# Patient Record
Sex: FEMALE | URBAN - METROPOLITAN AREA
[De-identification: names, ages, dates, MRNs, and addresses within clinical notes are randomized per-mention and may not be internally consistent; named-entity substitution may affect disease eponyms.]

---

## 2020-09-30 ENCOUNTER — APPOINTMENT (OUTPATIENT)
Dept: URBAN - METROPOLITAN AREA CLINIC 319 | Age: 65
Setting detail: DERMATOLOGY
End: 2020-09-30

## 2020-09-30 DIAGNOSIS — Z85.828 PERSONAL HISTORY OF OTHER MALIGNANT NEOPLASM OF SKIN: ICD-10-CM

## 2020-09-30 DIAGNOSIS — D485 NEOPLASM OF UNCERTAIN BEHAVIOR OF SKIN: ICD-10-CM

## 2020-09-30 PROBLEM — D48.5 NEOPLASM OF UNCERTAIN BEHAVIOR OF SKIN: Status: ACTIVE | Noted: 2020-09-30

## 2020-09-30 PROCEDURE — 11102 TANGNTL BX SKIN SINGLE LES: CPT

## 2020-09-30 PROCEDURE — OTHER PHOTO-DOCUMENTATION: OTHER

## 2020-09-30 PROCEDURE — OTHER MIPS QUALITY: OTHER

## 2020-09-30 PROCEDURE — OTHER COUNSELING: OTHER

## 2020-09-30 PROCEDURE — 11103 TANGNTL BX SKIN EA SEP/ADDL: CPT

## 2020-09-30 PROCEDURE — OTHER BIOPSY BY SHAVE METHOD: OTHER

## 2020-09-30 PROCEDURE — 99202 OFFICE O/P NEW SF 15 MIN: CPT | Mod: 25

## 2020-09-30 ASSESSMENT — LOCATION ZONE DERM
LOCATION ZONE: TRUNK
LOCATION ZONE: FACE

## 2020-09-30 ASSESSMENT — LOCATION DETAILED DESCRIPTION DERM
LOCATION DETAILED: RIGHT MEDIAL TEMPLE
LOCATION DETAILED: RIGHT MEDIAL SUPERIOR CHEST

## 2020-09-30 ASSESSMENT — LOCATION SIMPLE DESCRIPTION DERM
LOCATION SIMPLE: RIGHT TEMPLE
LOCATION SIMPLE: CHEST

## 2020-09-30 NOTE — PROCEDURE: BIOPSY BY SHAVE METHOD
Was A Bandage Applied: Yes
Biopsy Method: scissors
Billing Type: Third-Party Bill
Notification Instructions: Patient will be notified of biopsy results. However, patient instructed to call the office if not contacted within 2 weeks.
Silver Nitrate Text: The wound bed was treated with silver nitrate after the biopsy was performed.
Wound Care: Aquaphor
Destruction After The Procedure: No
Hemostasis: Lila's
Information: Selecting Yes will display possible errors in your note based on the variables you have selected. This validation is only offered as a suggestion for you. PLEASE NOTE THAT THE VALIDATION TEXT WILL BE REMOVED WHEN YOU FINALIZE YOUR NOTE. IF YOU WANT TO FAX A PRELIMINARY NOTE YOU WILL NEED TO TOGGLE THIS TO 'NO' IF YOU DO NOT WANT IT IN YOUR FAXED NOTE.
Type Of Destruction Used: Curettage
Anesthesia Type: 1% lidocaine with epinephrine
Curettage Text: The wound bed was treated with curettage after the biopsy was performed.
Size Of Lesion In Cm: 0
Anesthesia Volume In Cc (Will Not Render If 0): 1
Biopsy Type: H and E
Dressing: Band-Aid
Cryotherapy Text: The wound bed was treated with cryotherapy after the biopsy was performed.
Consent: Written consent was obtained and risks were reviewed including but not limited to scarring, infection, bleeding, scabbing, incomplete removal, nerve damage and allergy to anesthesia.
Post-Care Instructions: I reviewed with the patient in detail post-care instructions. Patient is to keep the biopsy site dry overnight, and then apply bacitracin twice daily until healed. Patient may apply hydrogen peroxide soaks to remove any crusting.
Depth Of Biopsy: dermis
Electrodesiccation And Curettage Text: The wound bed was treated with electrodesiccation and curettage after the biopsy was performed.
Detail Level: Detailed
Electrodesiccation Text: The wound bed was treated with electrodesiccation after the biopsy was performed.

## 2020-10-16 ENCOUNTER — APPOINTMENT (OUTPATIENT)
Dept: URBAN - METROPOLITAN AREA CLINIC 319 | Age: 65
Setting detail: DERMATOLOGY
End: 2020-10-16

## 2020-10-16 DIAGNOSIS — L82.1 OTHER SEBORRHEIC KERATOSIS: ICD-10-CM

## 2020-10-16 PROBLEM — C44.519 BASAL CELL CARCINOMA OF SKIN OF OTHER PART OF TRUNK: Status: ACTIVE | Noted: 2020-10-16

## 2020-10-16 PROCEDURE — OTHER COUNSELING: OTHER

## 2020-10-16 PROCEDURE — 99212 OFFICE O/P EST SF 10 MIN: CPT | Mod: 25

## 2020-10-16 PROCEDURE — OTHER CURETTAGE AND DESTRUCTION: OTHER

## 2020-10-16 PROCEDURE — 17262 DSTRJ MAL LES T/A/L 1.1-2.0: CPT

## 2020-10-16 PROCEDURE — OTHER TREATMENT REGIMEN: OTHER

## 2020-10-16 ASSESSMENT — LOCATION SIMPLE DESCRIPTION DERM: LOCATION SIMPLE: RIGHT TEMPLE

## 2020-10-16 ASSESSMENT — LOCATION DETAILED DESCRIPTION DERM: LOCATION DETAILED: RIGHT SUPERIOR TEMPLE

## 2020-10-16 ASSESSMENT — LOCATION ZONE DERM: LOCATION ZONE: FACE

## 2020-10-16 NOTE — PROCEDURE: CURETTAGE AND DESTRUCTION
Post-Care Instructions: I reviewed with the patient in detail post-care instructions. Patient is to keep the area dry for 48 hours. Should the patient develop any fevers, chills, bleeding, severe pain patient will contact the office immediately.

## 2021-05-04 ENCOUNTER — APPOINTMENT (OUTPATIENT)
Dept: URBAN - METROPOLITAN AREA CLINIC 319 | Age: 66
Setting detail: DERMATOLOGY
End: 2021-05-04

## 2021-05-04 DIAGNOSIS — L30.9 DERMATITIS, UNSPECIFIED: ICD-10-CM

## 2021-05-04 DIAGNOSIS — D485 NEOPLASM OF UNCERTAIN BEHAVIOR OF SKIN: ICD-10-CM

## 2021-05-04 DIAGNOSIS — Z12.83 ENCOUNTER FOR SCREENING FOR MALIGNANT NEOPLASM OF SKIN: ICD-10-CM

## 2021-05-04 PROBLEM — D48.5 NEOPLASM OF UNCERTAIN BEHAVIOR OF SKIN: Status: ACTIVE | Noted: 2021-05-04

## 2021-05-04 PROCEDURE — 69100 BIOPSY OF EXTERNAL EAR: CPT

## 2021-05-04 PROCEDURE — OTHER COUNSELING: OTHER

## 2021-05-04 PROCEDURE — OTHER PRESCRIPTION: OTHER

## 2021-05-04 PROCEDURE — OTHER BIOPSY BY SHAVE METHOD: OTHER

## 2021-05-04 PROCEDURE — 11102 TANGNTL BX SKIN SINGLE LES: CPT | Mod: 59

## 2021-05-04 PROCEDURE — 99214 OFFICE O/P EST MOD 30 MIN: CPT | Mod: 25

## 2021-05-04 PROCEDURE — OTHER PHOTO-DOCUMENTATION: OTHER

## 2021-05-04 RX ORDER — PIMECROLIMUS 10 MG/G
CREAM TOPICAL
Qty: 1 | Refills: 2 | Status: ERX | COMMUNITY
Start: 2021-05-04

## 2021-05-04 ASSESSMENT — LOCATION DETAILED DESCRIPTION DERM
LOCATION DETAILED: LEFT INFERIOR CENTRAL MALAR CHEEK
LOCATION DETAILED: RIGHT INFERIOR CENTRAL MALAR CHEEK
LOCATION DETAILED: MIDDLE STERNUM
LOCATION DETAILED: LEFT ANTERIOR PROXIMAL UPPER ARM
LOCATION DETAILED: RIGHT SUPERIOR HELIX
LOCATION DETAILED: RIGHT ANTERIOR PROXIMAL UPPER ARM

## 2021-05-04 ASSESSMENT — LOCATION SIMPLE DESCRIPTION DERM
LOCATION SIMPLE: RIGHT CHEEK
LOCATION SIMPLE: CHEST
LOCATION SIMPLE: LEFT UPPER ARM
LOCATION SIMPLE: LEFT CHEEK
LOCATION SIMPLE: RIGHT EAR
LOCATION SIMPLE: RIGHT UPPER ARM

## 2021-05-04 ASSESSMENT — LOCATION ZONE DERM
LOCATION ZONE: FACE
LOCATION ZONE: EAR
LOCATION ZONE: ARM
LOCATION ZONE: TRUNK

## 2021-05-04 NOTE — PROCEDURE: BIOPSY BY SHAVE METHOD
Hide Anticipated Plan (Based On Presumed Biopsy Results)?: No
Was A Bandage Applied: Yes
Cryotherapy Text: The wound bed was treated with cryotherapy after the biopsy was performed.
X Size Of Lesion In Cm: 0
Type Of Destruction Used: Curettage
Billing Type: Third-Party Bill
Biopsy Method: Dermablade
Post-Care Instructions: I reviewed with the patient in detail post-care instructions. Patient is to keep the biopsy site dry overnight, and then apply bacitracin twice daily until healed. Patient may apply hydrogen peroxide soaks to remove any crusting.
Hemostasis: Lila's
Curettage Text: The wound bed was treated with curettage after the biopsy was performed.
Electrodesiccation And Curettage Text: The wound bed was treated with electrodesiccation and curettage after the biopsy was performed.
Notification Instructions: Patient will be notified of biopsy results. However, patient instructed to call the office if not contacted within 2 weeks.
Biopsy Type: H and E
Depth Of Biopsy: dermis
Dressing: Band-Aid
Anesthesia Type: 1% lidocaine with epinephrine
Wound Care: Aquaphor
Electrodesiccation Text: The wound bed was treated with electrodesiccation after the biopsy was performed.
Silver Nitrate Text: The wound bed was treated with silver nitrate after the biopsy was performed.
Consent: Written consent was obtained and risks were reviewed including but not limited to scarring, infection, bleeding, scabbing, incomplete removal, nerve damage and allergy to anesthesia.
Information: Selecting Yes will display possible errors in your note based on the variables you have selected. This validation is only offered as a suggestion for you. PLEASE NOTE THAT THE VALIDATION TEXT WILL BE REMOVED WHEN YOU FINALIZE YOUR NOTE. IF YOU WANT TO FAX A PRELIMINARY NOTE YOU WILL NEED TO TOGGLE THIS TO 'NO' IF YOU DO NOT WANT IT IN YOUR FAXED NOTE.
Anesthesia Volume In Cc (Will Not Render If 0): 0.5
Detail Level: Detailed

## 2021-07-28 ENCOUNTER — APPOINTMENT (OUTPATIENT)
Dept: URBAN - METROPOLITAN AREA CLINIC 319 | Age: 66
Setting detail: DERMATOLOGY
End: 2021-07-28

## 2021-07-28 PROBLEM — C44.519 BASAL CELL CARCINOMA OF SKIN OF OTHER PART OF TRUNK: Status: ACTIVE | Noted: 2021-07-28

## 2021-07-28 PROCEDURE — OTHER CURETTAGE AND DESTRUCTION: OTHER

## 2021-07-28 PROCEDURE — 17262 DSTRJ MAL LES T/A/L 1.1-2.0: CPT

## 2021-11-17 ENCOUNTER — APPOINTMENT (OUTPATIENT)
Dept: URBAN - METROPOLITAN AREA CLINIC 319 | Age: 66
Setting detail: DERMATOLOGY
End: 2021-11-17

## 2021-11-17 DIAGNOSIS — Z12.83 ENCOUNTER FOR SCREENING FOR MALIGNANT NEOPLASM OF SKIN: ICD-10-CM

## 2021-11-17 DIAGNOSIS — L20.89 OTHER ATOPIC DERMATITIS: ICD-10-CM

## 2021-11-17 DIAGNOSIS — D22 MELANOCYTIC NEVI: ICD-10-CM

## 2021-11-17 DIAGNOSIS — D18.0 HEMANGIOMA: ICD-10-CM

## 2021-11-17 DIAGNOSIS — L71.8 OTHER ROSACEA: ICD-10-CM

## 2021-11-17 DIAGNOSIS — L82.1 OTHER SEBORRHEIC KERATOSIS: ICD-10-CM

## 2021-11-17 PROBLEM — D23.72 OTHER BENIGN NEOPLASM OF SKIN OF LEFT LOWER LIMB, INCLUDING HIP: Status: ACTIVE | Noted: 2021-11-17

## 2021-11-17 PROBLEM — D18.01 HEMANGIOMA OF SKIN AND SUBCUTANEOUS TISSUE: Status: ACTIVE | Noted: 2021-11-17

## 2021-11-17 PROBLEM — D22.5 MELANOCYTIC NEVI OF TRUNK: Status: ACTIVE | Noted: 2021-11-17

## 2021-11-17 PROBLEM — L20.84 INTRINSIC (ALLERGIC) ECZEMA: Status: ACTIVE | Noted: 2021-11-17

## 2021-11-17 PROCEDURE — OTHER COUNSELING: OTHER

## 2021-11-17 PROCEDURE — OTHER PRESCRIPTION: OTHER

## 2021-11-17 PROCEDURE — OTHER SUNSCREEN RECOMMENDATIONS: OTHER

## 2021-11-17 PROCEDURE — 99214 OFFICE O/P EST MOD 30 MIN: CPT

## 2021-11-17 PROCEDURE — OTHER REASSURANCE: OTHER

## 2021-11-17 PROCEDURE — OTHER TREATMENT REGIMEN: OTHER

## 2021-11-17 PROCEDURE — OTHER MIPS QUALITY: OTHER

## 2021-11-17 PROCEDURE — OTHER PRESCRIPTION MEDICATION MANAGEMENT: OTHER

## 2021-11-17 PROCEDURE — OTHER RECORDS REVIEWED: OTHER

## 2021-11-17 RX ORDER — DESONIDE 0.5 MG/G
CREAM TOPICAL
Qty: 60 | Refills: 2 | Status: ERX | COMMUNITY
Start: 2021-11-17

## 2021-11-17 RX ORDER — METRONIDAZOLE 7.5 MG/G
GEL TOPICAL
Qty: 45 | Refills: 3 | Status: ERX | COMMUNITY
Start: 2021-11-17

## 2021-11-17 ASSESSMENT — LOCATION DETAILED DESCRIPTION DERM
LOCATION DETAILED: LEFT CENTRAL MALAR CHEEK
LOCATION DETAILED: LEFT INFERIOR MEDIAL UPPER BACK
LOCATION DETAILED: RIGHT MEDIAL MALAR CHEEK
LOCATION DETAILED: INFERIOR THORACIC SPINE
LOCATION DETAILED: PERIUMBILICAL SKIN

## 2021-11-17 ASSESSMENT — LOCATION SIMPLE DESCRIPTION DERM
LOCATION SIMPLE: RIGHT CHEEK
LOCATION SIMPLE: LEFT CHEEK
LOCATION SIMPLE: ABDOMEN
LOCATION SIMPLE: LEFT UPPER BACK
LOCATION SIMPLE: UPPER BACK

## 2021-11-17 ASSESSMENT — LOCATION ZONE DERM
LOCATION ZONE: TRUNK
LOCATION ZONE: FACE

## 2021-11-17 NOTE — PROCEDURE: TREATMENT REGIMEN
Initiate Treatment: Desonide cream apply a thin layer to irritation on face twice daily until clear, no longer than 3 weeks
Detail Level: Zone